# Patient Record
Sex: MALE | Race: WHITE | Employment: FULL TIME | URBAN - METROPOLITAN AREA
[De-identification: names, ages, dates, MRNs, and addresses within clinical notes are randomized per-mention and may not be internally consistent; named-entity substitution may affect disease eponyms.]

---

## 2017-01-17 ENCOUNTER — APPOINTMENT (OUTPATIENT)
Dept: LAB | Facility: HOSPITAL | Age: 32
End: 2017-01-17
Attending: UROLOGY
Payer: COMMERCIAL

## 2017-01-17 DIAGNOSIS — Z98.52 STATUS POST VASECTOMY: ICD-10-CM

## 2017-01-17 PROCEDURE — 89321 SEMEN ANAL SPERM DETECTION: CPT

## 2017-01-18 ENCOUNTER — TELEPHONE (OUTPATIENT)
Dept: SURGERY | Facility: CLINIC | Age: 32
End: 2017-01-18

## 2017-01-18 NOTE — TELEPHONE ENCOUNTER
Pt states he left lab sample yesterday afternoon, and is requesting results when available.  Please call, thank you

## 2017-01-18 NOTE — TELEPHONE ENCOUNTER
Patient checking status on results. States it is ok to leave a detailed voicemail if he does not answer. Please call.  Thank you

## 2017-01-23 ENCOUNTER — TELEPHONE (OUTPATIENT)
Dept: SURGERY | Facility: CLINIC | Age: 32
End: 2017-01-23

## 2017-01-23 NOTE — TELEPHONE ENCOUNTER
Pt. Would like to get his test results. He states that this is 5th call. Pt. Was informed that office is closed at 5.

## 2017-01-23 NOTE — TELEPHONE ENCOUNTER
Pt checking on request for lab results since: 1/18. Pt requesting a call back right away on status, please call, thank you.

## 2017-01-23 NOTE — TELEPHONE ENCOUNTER
Pt checking on request for lab results since: 1/18. Pt requesting a call back right away on status, please call, thank you.  ( please see TE 1/18.)

## 2017-01-24 ENCOUNTER — OFFICE VISIT (OUTPATIENT)
Dept: FAMILY MEDICINE CLINIC | Facility: CLINIC | Age: 32
End: 2017-01-24

## 2017-01-24 VITALS
BODY MASS INDEX: 23 KG/M2 | WEIGHT: 175 LBS | HEART RATE: 69 BPM | DIASTOLIC BLOOD PRESSURE: 74 MMHG | TEMPERATURE: 98 F | SYSTOLIC BLOOD PRESSURE: 128 MMHG | OXYGEN SATURATION: 99 % | RESPIRATION RATE: 18 BRPM

## 2017-01-24 DIAGNOSIS — J01.40 ACUTE PANSINUSITIS, RECURRENCE NOT SPECIFIED: Primary | ICD-10-CM

## 2017-01-24 PROCEDURE — 99202 OFFICE O/P NEW SF 15 MIN: CPT | Performed by: NURSE PRACTITIONER

## 2017-01-24 RX ORDER — AMOXICILLIN AND CLAVULANATE POTASSIUM 875; 125 MG/1; MG/1
1 TABLET, FILM COATED ORAL 2 TIMES DAILY
Qty: 20 TABLET | Refills: 0 | Status: SHIPPED | OUTPATIENT
Start: 2017-01-24 | End: 2017-02-03

## 2017-01-24 NOTE — TELEPHONE ENCOUNTER
Pt requesting semen analysis results - upset because he has not received a cb yet - please advise - thank you.

## 2017-01-24 NOTE — TELEPHONE ENCOUNTER
Spoke to patient. Please see 1/19 Letter. Patient is aware of his semen analysis results and verbalized understanding.

## 2017-01-24 NOTE — PROGRESS NOTES
CHIEF COMPLAINT:   Patient presents with:  Sinus Problem      HPI:   Patrick Bello is a 27year old male who presents for cold symptoms for  7  days. Symptoms have progressed into sinus congestion and been worsening since onset.  Sinus congestion/pain is de NOSE: nostrils patent, yellow nasal mucous, nasal mucosa reddened and inflamed  THROAT: oral mucosa pink, moist. No visible dental caries. Posterior pharynx is erythematous. No exudates.   NECK: supple, non-tender  LUNGS: clear to auscultation bilaterally, · Take the full course of antibiotics as instructed. Do not stop taking them, even if you feel better. · Drink plenty of water, hot tea, and other liquids. This may help thin mucus. It also may promote sinus drainage.   · Heat may help soothe painful areas Call your healthcare provider if any of these occur:  · Facial pain or headache becoming more severe  · Stiff neck  · Unusual drowsiness or confusion  · Swelling of the forehead or eyelids  · Vision problems, including blurred or double vision  · Fever of

## 2020-03-09 ENCOUNTER — OFFICE VISIT (OUTPATIENT)
Dept: SURGERY | Facility: CLINIC | Age: 35
End: 2020-03-09
Payer: COMMERCIAL

## 2020-03-09 VITALS
DIASTOLIC BLOOD PRESSURE: 74 MMHG | WEIGHT: 185 LBS | HEART RATE: 56 BPM | BODY MASS INDEX: 23.74 KG/M2 | TEMPERATURE: 98 F | SYSTOLIC BLOOD PRESSURE: 119 MMHG | HEIGHT: 74 IN

## 2020-03-09 DIAGNOSIS — Z98.52 STATUS POST VASECTOMY: Primary | ICD-10-CM

## 2020-03-09 PROCEDURE — 99202 OFFICE O/P NEW SF 15 MIN: CPT | Performed by: UROLOGY

## 2020-03-09 NOTE — PROGRESS NOTES
SUBJECTIVE:  Rafaela Belcher is a 27year old male who presents for an office visit regarding postvasectomy assessment. The patient is status post vasectomy September 2016. He had a semen analysis January 2017 showing no detected sperm.   He is  and adult)   Pulse 56   Temp 98.3 °F (36.8 °C) (Oral)   Ht 6' 2\" (1.88 m)   Wt 185 lb (83.9 kg)   BMI 23.75 kg/m²   He appears well, in no apparent distress. Alert and oriented times three, pleasant and cooperative.   CARDIOVASCULAR:normal apical impulse  RES

## 2021-04-13 ENCOUNTER — IMMUNIZATION (OUTPATIENT)
Dept: LAB | Age: 36
End: 2021-04-13

## 2021-04-13 DIAGNOSIS — Z23 NEED FOR VACCINATION: Primary | ICD-10-CM

## 2021-04-13 PROCEDURE — 91301 COVID-19 MODERNA VACCINE: CPT

## 2021-04-13 PROCEDURE — 0011A COVID-19 MODERNA VACCINE: CPT

## 2021-05-12 ENCOUNTER — IMMUNIZATION (OUTPATIENT)
Dept: LAB | Age: 36
End: 2021-05-12
Attending: HOSPITALIST

## 2021-05-12 DIAGNOSIS — Z23 NEED FOR VACCINATION: Primary | ICD-10-CM

## 2021-05-12 PROCEDURE — 91301 COVID-19 MODERNA VACCINE: CPT | Performed by: NURSE PRACTITIONER

## 2021-05-12 PROCEDURE — 0012A COVID-19 MODERNA VACCINE: CPT | Performed by: NURSE PRACTITIONER

## 2022-07-14 ENCOUNTER — OFFICE VISIT (OUTPATIENT)
Dept: INTERNAL MEDICINE CLINIC | Facility: CLINIC | Age: 37
End: 2022-07-14
Payer: COMMERCIAL

## 2022-07-14 VITALS
DIASTOLIC BLOOD PRESSURE: 74 MMHG | HEART RATE: 64 BPM | BODY MASS INDEX: 20.92 KG/M2 | HEIGHT: 74 IN | WEIGHT: 163 LBS | OXYGEN SATURATION: 99 % | SYSTOLIC BLOOD PRESSURE: 112 MMHG

## 2022-07-14 DIAGNOSIS — Z00.00 HEALTH MAINTENANCE EXAMINATION: Primary | ICD-10-CM

## 2022-07-14 DIAGNOSIS — F43.9 STRESS: ICD-10-CM

## 2022-07-14 LAB
ALBUMIN SERPL-MCNC: 4.6 G/DL (ref 3.4–5)
ALBUMIN/GLOB SERPL: 1.2 {RATIO} (ref 1–2)
ALP LIVER SERPL-CCNC: 54 U/L
ALT SERPL-CCNC: 32 U/L
ANION GAP SERPL CALC-SCNC: 5 MMOL/L (ref 0–18)
AST SERPL-CCNC: 18 U/L (ref 15–37)
BASOPHILS # BLD AUTO: 0.05 X10(3) UL (ref 0–0.2)
BASOPHILS NFR BLD AUTO: 0.9 %
BILIRUB SERPL-MCNC: 0.7 MG/DL (ref 0.1–2)
BUN BLD-MCNC: 17 MG/DL (ref 7–18)
BUN/CREAT SERPL: 15.6 (ref 10–20)
CALCIUM BLD-MCNC: 9.5 MG/DL (ref 8.5–10.1)
CHLORIDE SERPL-SCNC: 103 MMOL/L (ref 98–112)
CHOLEST SERPL-MCNC: 140 MG/DL (ref ?–200)
CO2 SERPL-SCNC: 30 MMOL/L (ref 21–32)
CREAT BLD-MCNC: 1.09 MG/DL
DEPRECATED RDW RBC AUTO: 41.4 FL (ref 35.1–46.3)
EOSINOPHIL # BLD AUTO: 0.04 X10(3) UL (ref 0–0.7)
EOSINOPHIL NFR BLD AUTO: 0.7 %
ERYTHROCYTE [DISTWIDTH] IN BLOOD BY AUTOMATED COUNT: 11.6 % (ref 11–15)
FASTING PATIENT LIPID ANSWER: YES
FASTING STATUS PATIENT QL REPORTED: YES
GLOBULIN PLAS-MCNC: 3.8 G/DL (ref 2.8–4.4)
GLUCOSE BLD-MCNC: 96 MG/DL (ref 70–99)
HCT VFR BLD AUTO: 46.4 %
HCV AB SERPL QL IA: NONREACTIVE
HDLC SERPL-MCNC: 62 MG/DL (ref 40–59)
HGB BLD-MCNC: 15.5 G/DL
IMM GRANULOCYTES # BLD AUTO: 0.03 X10(3) UL (ref 0–1)
IMM GRANULOCYTES NFR BLD: 0.5 %
LDLC SERPL CALC-MCNC: 62 MG/DL (ref ?–100)
LYMPHOCYTES # BLD AUTO: 1.23 X10(3) UL (ref 1–4)
LYMPHOCYTES NFR BLD AUTO: 21.8 %
MCH RBC QN AUTO: 32.2 PG (ref 26–34)
MCHC RBC AUTO-ENTMCNC: 33.4 G/DL (ref 31–37)
MCV RBC AUTO: 96.5 FL
MONOCYTES # BLD AUTO: 0.42 X10(3) UL (ref 0.1–1)
MONOCYTES NFR BLD AUTO: 7.4 %
NEUTROPHILS # BLD AUTO: 3.88 X10 (3) UL (ref 1.5–7.7)
NEUTROPHILS # BLD AUTO: 3.88 X10(3) UL (ref 1.5–7.7)
NEUTROPHILS NFR BLD AUTO: 68.7 %
NONHDLC SERPL-MCNC: 78 MG/DL (ref ?–130)
OSMOLALITY SERPL CALC.SUM OF ELEC: 287 MOSM/KG (ref 275–295)
PLATELET # BLD AUTO: 201 10(3)UL (ref 150–450)
POTASSIUM SERPL-SCNC: 4.5 MMOL/L (ref 3.5–5.1)
PROT SERPL-MCNC: 8.4 G/DL (ref 6.4–8.2)
RBC # BLD AUTO: 4.81 X10(6)UL
SODIUM SERPL-SCNC: 138 MMOL/L (ref 136–145)
TRIGL SERPL-MCNC: 81 MG/DL (ref 30–149)
TSI SER-ACNC: 1.51 MIU/ML (ref 0.36–3.74)
VLDLC SERPL CALC-MCNC: 12 MG/DL (ref 0–30)
WBC # BLD AUTO: 5.7 X10(3) UL (ref 4–11)

## 2022-07-14 PROCEDURE — 99385 PREV VISIT NEW AGE 18-39: CPT | Performed by: FAMILY MEDICINE

## 2022-07-14 PROCEDURE — 3008F BODY MASS INDEX DOCD: CPT | Performed by: FAMILY MEDICINE

## 2022-07-14 PROCEDURE — 80050 GENERAL HEALTH PANEL: CPT | Performed by: FAMILY MEDICINE

## 2022-07-14 PROCEDURE — 3078F DIAST BP <80 MM HG: CPT | Performed by: FAMILY MEDICINE

## 2022-07-14 PROCEDURE — 86803 HEPATITIS C AB TEST: CPT | Performed by: FAMILY MEDICINE

## 2022-07-14 PROCEDURE — 87389 HIV-1 AG W/HIV-1&-2 AB AG IA: CPT | Performed by: FAMILY MEDICINE

## 2022-07-14 PROCEDURE — 80061 LIPID PANEL: CPT | Performed by: FAMILY MEDICINE

## 2022-07-14 PROCEDURE — 3074F SYST BP LT 130 MM HG: CPT | Performed by: FAMILY MEDICINE

## 2022-07-14 PROCEDURE — 90471 IMMUNIZATION ADMIN: CPT | Performed by: FAMILY MEDICINE

## 2022-07-14 PROCEDURE — 90715 TDAP VACCINE 7 YRS/> IM: CPT | Performed by: FAMILY MEDICINE

## 2022-07-14 PROCEDURE — 96127 BRIEF EMOTIONAL/BEHAV ASSMT: CPT | Performed by: FAMILY MEDICINE

## 2022-07-14 NOTE — ASSESSMENT & PLAN NOTE
Exercise and diet advised - continue. Has been losing some weight, however eats very healthy and exercises regularly. Some of weight loss seems to be stress related. Advised monitoring weights for now, if continuing to lose weight, will further evaluate. BMI today 20.9. CBC, CMP, lipid panel, TSH, HIV screen, hepatitis C screen,  Tdap today. Advanced directive information provided. Advised COVID vaccine booster.

## 2022-07-15 ENCOUNTER — TELEPHONE (OUTPATIENT)
Dept: INTERNAL MEDICINE CLINIC | Facility: CLINIC | Age: 37
End: 2022-07-15

## 2024-02-21 NOTE — PROGRESS NOTES
FAMILY MEDICINE CLINIC NOTE    HPI  Price Franklin is a 38 year old male presenting for physical    #Health Maintenance  -Diet: Good for the most part. Eats vegetables, no fruits. Not a snacker. Cooks at home. Eating more since holidays.   -Exercise: runs 3-4 times a week, occasional light weight training  -Lung cancer screen: Not indicated  -Colon cancer screen: Not indicated  -Prostate cancer screen: Not indicated  -Aortic aneurysm screen: Not indicated  -Statin:  Will check lipid panel  -ASA: Not indicated  -HIV screen: 7/2022 negative  -Hep C screen: 7/2022 negative   -Gonorrhea/chlamydia:  Not indicated  -Syphillis: Not indicated  -TB: Not indicated  -Tobacco/alcohol: Per below  -Depression: PHQ-2 score of 0 (score >/= 3 do PHQ-9)  -Advanced Directive: Indicated     #Immunizations  -Tdap: 7/2022  -Flu shot: Indicated - declines  -PCV13: Not indicated   -PCV20: Not indicated   -PPSV23: Not indicated   -HPV: Not indicated  -RZV (preferred) or VZL: Not indicated   -RSV: Not indicated  -COVID: Moderna     #Stress--- resolved  #Sleep deprivation---resolved  -related to work - more responsibilities, struggling to disconnect from work  -started since February  -appetite affected  -running stress relief  -previously more of a relaxed individual  -RAFAT& score of 9, somewhat difficult  -triggering event was work   -denies symptoms of janee  -no SI/HI  -no AVH  -stays up late at night, working late    2/2024  -reports significant improvement  -work has improved       #Patient Care Team  Patient Care Team:  Marquise Devries MD as PCP - General (Family Medicine)    ROS  GENERAL: No fever/chills, no recent weight loss   HEENT: No visual changes, no changes in hearing, no sore throats  NECK: No pain, no swelling  RESP: No cough, no SOB  CV: No chest pain, no palpitations  GI: No abd pain, no N/V/D  MSK: No edema, no pain  SKIN: No new rashes  NEURO: No numbness, no tingling, no HA    HEALTH MAINTENANCE CHECKLIST  Health  Maintenance Topics with due status: Overdue       Topic Date Due    Annual Physical 07/14/2023    COVID-19 Vaccine 09/01/2023    Influenza Vaccine 10/01/2023    Annual Depression Screening 01/01/2024       ALLERGIES  No Known Allergies    MEDICATIONS  No current outpatient medications on file.       ACTIVE PROBLEM  Patient Active Problem List   Diagnosis    Health maintenance examination       PAST MEDICAL HISTORY  Past Medical History:   Diagnosis Date    History of pyloric stenosis as a child        PAST SOCIAL HISTORY  Social History     Socioeconomic History    Marital status:      Spouse name: Not on file    Number of children: 2    Years of education: Not on file    Highest education level: Not on file   Occupational History    Occupation: finance     Comment: full time   Tobacco Use    Smoking status: Never    Smokeless tobacco: Never   Vaping Use    Vaping Use: Never used   Substance and Sexual Activity    Alcohol use: Yes     Comment: Occasionally - weekends    Drug use: Not Currently     Types: Cannabis    Sexual activity: Yes     Partners: Female     Birth control/protection: Vasectomy   Other Topics Concern    Not on file   Social History Narrative    Relationships:  - Beulah (math )    Children: Jason (9F), Elan (7M)    Pets: None    School: N/A    Work: Commercial finance - [employer]    Origin: From [place]    Interests: Running, watching football ([employer] - bears, fantasy football), walking around [place]     Spiritual: Not Temple, not spiritual     Social Determinants of Health     Financial Resource Strain: Not on file   Food Insecurity: Not on file   Transportation Needs: Not on file   Physical Activity: Not on file   Stress: Not on file   Social Connections: Not on file   Housing Stability: Not on file       PAST SURGICAL HISTORY  Past Surgical History:   Procedure Laterality Date    OTHER      Pyloric stenosis    VASECTOMY         PAST  FAMILY HISTORY  Family History   Problem Relation Age of Onset    Breast Cancer Mother     Cancer Father         Leg    No Known Problems Sister     No Known Problems Brother     No Known Problems Maternal Grandmother     Cancer Maternal Grandfather         Lung    No Known Problems Paternal Grandmother     No Known Problems Paternal Grandfather     No Known Problems Brother     No Known Problems Brother     Colon Cancer Neg     Prostate Cancer Neg        PHYSICAL EXAM  Vitals:    02/23/24 0953   BP: 124/76   Pulse: 63   Temp: 97.7 °F (36.5 °C)   SpO2: 97%   Weight: 186 lb (84.4 kg)   Height: 6' 2\" (1.88 m)      Body mass index is 23.88 kg/m².    GENERAL: NAD  HEENT: Moist mucous membranes, no tonsillar swelling or erythema, PERRLA bilat, TM translucent and non-bulging  NECK: Supple, non-tender  RESP: CTAB, no wheezing, no rales, no ronchi  CV: RRR, no murmurs  GI: Soft, non-distended, non-tender, no guarding, no rebound, no masses  MSK: No edema  SKIN: Warm and dry, no rashes  NEURO: Answering questions appropriately    LABS  Lab Results   Component Value Date    WBC 5.7 07/14/2022    HGB 15.5 07/14/2022    HCT 46.4 07/14/2022    .0 07/14/2022    NEPERCENT 68.7 07/14/2022    LYPERCENT 21.8 07/14/2022    MOPERCENT 7.4 07/14/2022    EOPERCENT 0.7 07/14/2022    BAPERCENT 0.9 07/14/2022    NE 3.88 07/14/2022    LYMABS 1.23 07/14/2022    MOABSO 0.42 07/14/2022    EOABSO 0.04 07/14/2022    BAABSO 0.05 07/14/2022       Lab Results   Component Value Date     07/14/2022    K 4.5 07/14/2022     07/14/2022    CO2 30.0 07/14/2022    ANIONGAP 5 07/14/2022    BUN 17 07/14/2022    CREATSERUM 1.09 07/14/2022    BUNCREA 15.6 07/14/2022    GLU 96 07/14/2022    CA 9.5 07/14/2022    OSMOCALC 287 07/14/2022    GFRNAA 86 07/14/2022    GFRAA 100 07/14/2022    ALT 32 07/14/2022    AST 18 07/14/2022    ALKPHO 54 07/14/2022    BILT 0.7 07/14/2022    TP 8.4 (H) 07/14/2022    ALB 4.6 07/14/2022    GLOBULIN 3.8 07/14/2022     ELECTAG 1.2 07/14/2022    FASTING Yes 07/14/2022    FASTING Yes 07/14/2022         Lab Results   Component Value Date    CHOLEST 140 07/14/2022    TRIG 81 07/14/2022    HDL 62 (H) 07/14/2022    LDL 62 07/14/2022    VLDL 12 07/14/2022    NONHDLC 78 07/14/2022        DIAGNOSTICS    ASSESSMENT/PLAN  Problem List Items Addressed This Visit          Mental Health    RESOLVED: Stress     Improved at this time.  Can monitor.             Health Encounters    Health maintenance examination - Primary     Exercise and diet advised.  No blood work needed today.  Has gained some weight, but still well within a normal BMI range.  Flu shot deferred  Advanced directive information provided.  Advised COVID vaccine.            Return in about 1 year (around 2/23/2025) for physical .    Marquise Devries MD  Family Medicine

## 2024-02-23 ENCOUNTER — OFFICE VISIT (OUTPATIENT)
Dept: INTERNAL MEDICINE CLINIC | Facility: CLINIC | Age: 39
End: 2024-02-23
Payer: COMMERCIAL

## 2024-02-23 VITALS
OXYGEN SATURATION: 97 % | DIASTOLIC BLOOD PRESSURE: 76 MMHG | HEIGHT: 74 IN | WEIGHT: 186 LBS | HEART RATE: 63 BPM | SYSTOLIC BLOOD PRESSURE: 124 MMHG | BODY MASS INDEX: 23.87 KG/M2 | TEMPERATURE: 98 F

## 2024-02-23 DIAGNOSIS — Z00.00 HEALTH MAINTENANCE EXAMINATION: Primary | ICD-10-CM

## 2024-02-23 DIAGNOSIS — F43.9 STRESS: ICD-10-CM

## 2024-02-23 PROCEDURE — 3074F SYST BP LT 130 MM HG: CPT | Performed by: FAMILY MEDICINE

## 2024-02-23 PROCEDURE — 99395 PREV VISIT EST AGE 18-39: CPT | Performed by: FAMILY MEDICINE

## 2024-02-23 PROCEDURE — 3008F BODY MASS INDEX DOCD: CPT | Performed by: FAMILY MEDICINE

## 2024-02-23 PROCEDURE — 3078F DIAST BP <80 MM HG: CPT | Performed by: FAMILY MEDICINE

## 2024-02-23 NOTE — ASSESSMENT & PLAN NOTE
Exercise and diet advised.  No blood work needed today.  Has gained some weight, but still well within a normal BMI range.  Flu shot deferred  Advanced directive information provided.  Advised COVID vaccine.

## 2024-02-23 NOTE — PATIENT INSTRUCTIONS
PATIENT INSTRUCTIONS    Thank you for seeing me today, it was a pleasure taking care of you.  Please check out at the  and schedule a follow up appointment.  Return in about 1 year (around 2/23/2025) for physical .  Please remember that the julienne period for all appointments is 5 minutes. This is to help maximize the amount of time that we can spend together at our visits.    The following imaging studies were ordered: None  Please also follow up with the following specialists: None  Please fill out the advance directive information (power of  documents) and bring a copy to our clinic.  Continue healthy diet and exercise  Consider COVID and flu shots    Dr. Jaycob Borja

## 2025-08-15 ENCOUNTER — OFFICE VISIT (OUTPATIENT)
Dept: INTERNAL MEDICINE CLINIC | Facility: CLINIC | Age: 40
End: 2025-08-15

## 2025-08-15 VITALS
WEIGHT: 189.63 LBS | SYSTOLIC BLOOD PRESSURE: 114 MMHG | OXYGEN SATURATION: 98 % | HEART RATE: 51 BPM | DIASTOLIC BLOOD PRESSURE: 70 MMHG | BODY MASS INDEX: 24.34 KG/M2 | HEIGHT: 74 IN

## 2025-08-15 DIAGNOSIS — Z00.00 HEALTH MAINTENANCE EXAMINATION: Primary | ICD-10-CM

## (undated) NOTE — MR AVS SNAPSHOT
[address] [facility]  [phone]               Thank you for choosing us for your health care visit with DAINA Baker. We are glad to serve you and happy to provide you with this summary of your visit.   P · Over-the-counter decongestants may be used unless a similar medicine was prescribed. Nasal sprays work the fastest. Use one that contains phenylephrine or oxymetazoline. First blow the nose gently. Then use the spray.  Do not use these medicines more ofte [address]71. All rights reserved. This information is not intended as a substitute for professional medical care. Always follow your healthcare professional's instructions.              Your Appointments     Jan 24, 2017 11:30 AM   [facility]-VISIT with Nestor Ireland [email or web address]